# Patient Record
Sex: FEMALE | Race: WHITE
[De-identification: names, ages, dates, MRNs, and addresses within clinical notes are randomized per-mention and may not be internally consistent; named-entity substitution may affect disease eponyms.]

---

## 2018-07-15 NOTE — ER DOCUMENT REPORT
ED General





- General


Chief Complaint: Vag Bleeding, +preg <12wks


Stated Complaint: VAGINAL BLEEDING


Time Seen by Provider: 07/15/18 07:07


TRAVEL OUTSIDE OF THE [place] IN LAST 30 DAYS: No





- HPI


Patient complains to provider of: Vaginal bleeding positive pregnancy


Notes: 





Patient coming in  her vaginal bleeding spotting.  Patient states started 

earlier this morning.  Patient denies any abdominal pain, cramping.  Patient 

denies any use of fertility medications.  Patient is unaware of her artery 

status that she has been taken prenatal care and has already followed up with 

her the OB/GYN town with his health care clinic.  States no comp occasions 

during previous pregnancies.  Denies any fevers chills nausea vomiting diarrhea





- Related Data


Allergies/Adverse Reactions: 


 





aspirin [Aspirin] Allergy (Verified 16 12:03)


 











Past Medical History





- General


Last Menstrual Period: 





- Social History


Smoking Status: Current Some Day Smoker


Chew tobacco use (# tins/day): No


Frequency of alcohol use: None


Drug Abuse: None


Family History: Reviewed & Not Pertinent


Patient has suicidal ideation: No


Patient has homicidal ideation: No





- Past Medical History


Cardiac Medical History: Reports: Hx Hypercholesterolemia, Hx Hypertension


Renal/ Medical History: Denies: Hx Peritoneal Dialysis


Psychiatric Medical History: Reports: Hx Anxiety


Past Surgical History: Reports: Hx Abdominal Surgery - bariatric, Hx 

Appendectomy, Hx Gastric Bypass Surgery, Hx Nose Surgery - rhinoplasty





- Immunizations


Hx Diphtheria, Pertussis, Tetanus Vaccination: No





Review of Systems





- Review of Systems


Constitutional: No symptoms reported


EENT: No symptoms reported


Cardiovascular: No symptoms reported


Respiratory: No symptoms reported


Gastrointestinal: No symptoms reported


Genitourinary: No symptoms reported


Female Genitourinary: Vaginal bleeding


Musculoskeletal: No symptoms reported


Skin: No symptoms reported


Hematologic/Lymphatic: No symptoms reported


Neurological/Psychological: No symptoms reported


-: Yes All other systems reviewed and negative





Physical Exam





- Vital signs


Vitals: 


 











Pulse Resp BP Pulse Ox


 


 106 H  17   150/91 H  99 


 


 07/15/18 06:09  07/15/18 06:09  07/15/18 06:09  07/15/18 06:09











Interpretation: Normal





- General


General appearance: Appears well, Alert





- HEENT


Head: Normocephalic, Atraumatic


Eyes: Normal


Pupils: PERRL





- Respiratory


Respiratory status: No respiratory distress


Chest status: Nontender


Breath sounds: Normal


Chest palpation: Normal





- Cardiovascular


Rhythm: Regular


Heart sounds: Normal auscultation


Murmur: No





- Abdominal


Inspection: Normal


Distension: No distension


Bowel sounds: Normal


Tenderness: Nontender


Organomegaly: No organomegaly





- Back


Back: Normal, Nontender





- Extremities


General upper extremity: Normal inspection, Nontender, Normal color, Normal ROM

, Normal temperature


General lower extremity: Normal inspection, Nontender, Normal color, Normal ROM

, Normal temperature, Normal weight bearing.  No: Phil's sign





- Neurological


Neuro grossly intact: Yes


Cognition: Normal


Orientation: AAOx4


Farrah Coma Scale Eye Opening: Spontaneous


Farrah Coma Scale Verbal: Oriented


Valdez Coma Scale Motor: Obeys Commands


Valdez Coma Scale Total: 15


Speech: Normal


Motor strength normal: LUE, RUE, LLE, RLE


Sensory: Normal





- Psychological


Associated symptoms: Normal affect, Normal mood





- Skin


Skin Temperature: Warm


Skin Moisture: Dry


Skin Color: Normal





Course





- Re-evaluation


Re-evalutation: 





07/15/18 13:33


Ultrasound shows IUP patient is a positive otherwise no signs of significant 

anemia requiring transfusion at this time.  Patient was encouraged to continue 

take her prenatal vitamins [facility] repeat beta-

hCG testing.





- Vital Signs


Vital signs: 


 











Temp Pulse Resp BP Pulse Ox


 


 98.7 F   93   16   135/87 H  97 


 


 07/15/18 08:55  07/15/18 08:55  07/15/18 08:55  07/15/18 08:55  07/15/18 08:55














- Laboratory


Result Diagrams: 


 07/15/18 07:35





 07/15/18 07:35


Laboratory results interpreted by me: 


 











  07/15/18 07/15/18





  07:35 07:35


 


Hgb  9.0 L 


 


Hct  27.4 L 


 


MCV  74 L 


 


MCH  24.3 L 


 


RDW  16.8 H 


 


Beta HCG, Quant   92777.00 H














Discharge





- Discharge


Clinical Impression: 


 Bleeding in early pregnancy





Condition: Good


Disposition: HOME, SELF-CARE


Instructions:  Bleeding During Early Pregnancy ([facility])


Additional Instructions: 


At this time your ultrasound shows a developing fetus within the uterus your 

blood level is around 14,500.  Would highly recommend observing pelvic rest 

nothing inside the vagina no sex no toys no tampons.  Would also recommend no 

strenuous activity.  Please continue your prenatal vitamins.  I would recommend 

following up with your OB/GYN on Monday he can also return to the ER and 72 

hours for repeat beta-hCG testing.  Please go to the  asked for 

outpatient laboratory have your blood drawn wait 2-3 hours  call the main ER 

number go through to prompts to get her results.  I would recommend doing this 

between business hours 8-5p.





There is a chance that she may continue on to have a miscarriage.  Bleeding 

early in pregnancy does increase chances of having a miscarriage by 

approximately 10%.  Return to ER immediately if she developed lightheadedness 

abdominal pain or fever





For nausea and vomiting during pregnancy I recomment:


Start with 10-12.5 mg of pyridoxine (vitamin B6) three times a day for 2 days. 

If not fully effective,


Increase to 12.5 mg of pyridoxine four times a day for 2 days. If not fully 

effective,


Increase to 25 mg of pyridoxine three times a day for 2 days. If not fully 

effective,


Continue 25 mg pyridoxine 3 times a day, and add 12.5 mg of doxylamine before 

bedtime each day for 2 days. If not fully effective,


Continue 25 mg pyridoxine 3 times a day, and take 12.5 mg of doxylamine twice a 

day. If not fully effective,


Continue 25 mg pyridoxine 3 times a day, and take 12.5 mg of doxylamine three 

times a day. If not fully effective,


Continue 25 mg pyridoxine 3 times a day, and 12.5 mg of doxylamine 3 times a day

, while adding Emetrol, one to two tablespoons (15-30 cc) taken once or twice a 

day as needed. (Emetrol is an over-the-counter mixture of sugar syrups and 

phosphoric acid [phosphorylated carbohydrate solution]) that acts by soothing 

the actual wall of the gastrointestinal tract). If not fully effective,


Consult with your doctor.


Forms:  Follow-Up Laboratory Testing

## 2018-07-15 NOTE — RADIOLOGY REPORT (SQ)
EXAM DESCRIPTION:  U/S OB TRANSVAG W/DOPPLER



COMPLETED DATE/TIME:  7/15/2018 8:08 am



REASON FOR STUDY:  _+preg vag bleeding



COMPARISON:  None.



TECHNIQUE:  Transvaginal static and realtime grayscale images acquired of the pelvis. Additional dashawn
cted spectral and color Doppler images recorded. All images stored on PACs.

bHCG: Pending.



LIMITATIONS:  None.



FINDINGS:  UTERUS: No masses.  No anomalies.

GESTATIONAL SAC: Yes.  6 weeks 1 day.

YOLK SAC: None

FETAL POLE: No 0

RIGHT ADNEXA: Ovary not identified.

No adnexal free fluid.

No adnexal masses.

LEFT ADNEXA: Normal ovary with normal vascular flow.

No adnexal free fluid.

2.2 cm cyst.

FREE FLUID: None.

OTHER: Cervical length 2.9 cm.



IMPRESSION:  POSSIBLE EARLY INTRAUTERINE PREGNANCY.

BHCG LEVEL NOT AVAILABLE FOR CORRELATION WITH US FINDINGS.

CONSIDER F/U BHCG AND/OR ULTRASOUND FOR VERIFICATION AND TO EXCLUDE ECTOPIC PREGNANCY.

Trimester of pregnancy:  First - 0 to 13 weeks.



TECHNICAL DOCUMENTATION:  JOB ID:  [id number]

 2011 [employer]- All Rights Reserved



Reading location - IP/workstation name: [email or web address]

## 2018-11-21 NOTE — XCELERA REPORT
[address] [address]

                               Tel: [phone]

                               Fax: [phone]



                      Transthoracic Echocardiogram Report

_______________________________________________________________________________



Name: KALYAN AVILA

MRN: [id number]                                Age: 42 yrs

Gender: Female                                 : 1975

Patient Status: Preadmit                       Patient Location: 

Account #: [id number]

Study Date: 2018 09:49 AM

Accession #: [id number]

_______________________________________________________________________________





_______________________________________________________________________________

Procedure: A two-dimensional transthoracic echocardiogram with color flow and

Doppler was performed. The study was technically difficult with many images

being suboptimal in quality.

Reason For Study: PERIPHERAL EDEMA



History: PERIPHERAL EDEMA.

Ordering Physician: RAJINDER HUMPHREY PA-C

Performed By: Radha Montilla

_______________________________________________________________________________





Interpretation Summary

The left ventricle is normal in size.

There is normal left ventricular wall thickness.

LV EF is > than 60%

Left ventricular systolic function is normal.

Doppler measurements suggest normal left ventricular diastolic function

The left ventricular wall motion is normal.

There is no thrombus.

The right atrium is normal.

The left atrial size is normal.

There is no evidence of mitral valve prolapse.

There is no vegetation seen on the mitral valve.

There is no mitral valve stenosis.

There is no mitral regurgitation noted.

There is no aortic valve stenosis

There is no LVOT obstruction.

No aortic regurgitation is present.

There is no tricuspid stenosis.

There is a trace amount of tricuspid regurgitation

Unable to calculate RVSP due lack of TR jet.

There is no pulmonic valvular stenosis.

There is a trace amount of pulmonic regurgitation

The aortic root is normal size.

There is no pericardial effusion.



MMode/2D Measurements & Calculations

RVDd: 3.1 cm   LVIDd: 5.2 cm  FS: 34.9 %              Ao root diam: 2.6 cm

IVSd: 0.78 cm  LVIDs: 3.4 cm  EDV(Teich): 128.0 ml    Ao root area: 5.3 cm2

               LVPWd: 1.0 cm  ESV(Teich): 46.4 ml

                              EF(Teich): 63.7 %



Doppler Measurements & Calculations

MV E max peter:       MV dec slope:         Ao V2 max:        LV V1 max P.0 cm/sec                               106.7 cm/sec      3.5 mmHg

MV A max peter:       545.1 cm/sec2         Ao max PG:        LV V1 max:

59.8 cm/sec         MV dec time: 0.13 sec 4.6 mmHg          93.5 cm/sec

MV E/A: 1.2



        _______________________________________________________________

PA V2 max:          PI max peter:

120.8 cm/sec        151.7 cm/sec

PA max P.8 mmHg PI max P.2 mmHg

                    PI dec slope:

                    133.5 cm/sec2





Left Ventricle

The left ventricle is normal in size. There is normal left ventricular wall

thickness. LV EF is > than 60%. Left ventricular systolic function is normal.

Doppler measurements suggest normal left ventricular diastolic function. The

left ventricular wall motion is normal. There is no thrombus.



Right Ventricle

The right ventricle is not well visualized secondary to technical limitations.



Atria

The right atrium is normal. The left atrial size is normal.



Mitral Valve

There is no evidence of mitral valve prolapse. There is no vegetation seen on

the mitral valve. There is no mitral valve stenosis. There is no mitral

regurgitation noted.





Aortic Valve

There is no aortic valvular vegetation. There is no aortic valve stenosis.

There is no LVOT obstruction. No aortic regurgitation is present.



Tricuspid Valve

There is no tricuspid stenosis. There is a trace amount of tricuspid

regurgitation. Unable to calculate RVSP due lack of TR jet.



Pulmonic Valve

There is no pulmonic valvular stenosis. There is a trace amount of pulmonic

regurgitation.



Great Vessels

The aortic root is normal size.





Effusions

There is no pericardial effusion.



_______________________________________________________________________________

_______________________________________________________________________________



Electronically signed by:      Lori Maier      on 2018 02:09 PM



CC: RAJINDER HUMPHREY PA-C

>

Lori Maier

## 2019-06-06 ENCOUNTER — HOSPITAL ENCOUNTER (EMERGENCY)
Dept: HOSPITAL 62 - ER | Age: 44
Discharge: HOME | End: 2019-06-06
Payer: MEDICAID

## 2019-06-06 VITALS — DIASTOLIC BLOOD PRESSURE: 62 MMHG | SYSTOLIC BLOOD PRESSURE: 120 MMHG

## 2019-06-06 DIAGNOSIS — Z90.49: ICD-10-CM

## 2019-06-06 DIAGNOSIS — Z98.84: ICD-10-CM

## 2019-06-06 DIAGNOSIS — R14.0: Primary | ICD-10-CM

## 2019-06-06 DIAGNOSIS — K59.00: ICD-10-CM

## 2019-06-06 DIAGNOSIS — F17.200: ICD-10-CM

## 2019-06-06 DIAGNOSIS — Z82.49: ICD-10-CM

## 2019-06-06 DIAGNOSIS — G89.29: ICD-10-CM

## 2019-06-06 LAB
ADD MANUAL DIFF: NO
ALBUMIN SERPL-MCNC: 4.3 G/DL (ref 3.5–5)
ALP SERPL-CCNC: 103 U/L (ref 38–126)
ALT SERPL-CCNC: 22 U/L (ref 9–52)
ANION GAP SERPL CALC-SCNC: 9 MMOL/L (ref 5–19)
APPEARANCE UR: (no result)
APTT PPP: (no result) S
AST SERPL-CCNC: 25 U/L (ref 14–36)
BASOPHILS # BLD AUTO: 0.1 10^3/UL (ref 0–0.2)
BASOPHILS NFR BLD AUTO: 1.3 % (ref 0–2)
BILIRUB DIRECT SERPL-MCNC: 0.3 MG/DL (ref 0–0.4)
BILIRUB SERPL-MCNC: 0.4 MG/DL (ref 0.2–1.3)
BILIRUB UR QL STRIP: NEGATIVE
BUN SERPL-MCNC: 9 MG/DL (ref 7–20)
CALCIUM: 9.6 MG/DL (ref 8.4–10.2)
CHLORIDE SERPL-SCNC: 105 MMOL/L (ref 98–107)
CHOLEST SERPL-MCNC: 190.06 MG/DL (ref 0–200)
CK MB SERPL-MCNC: < 0.22 NG/ML (ref ?–4.55)
CK SERPL-CCNC: 67 U/L (ref 30–135)
CO2 SERPL-SCNC: 24 MMOL/L (ref 22–30)
EOSINOPHIL # BLD AUTO: 0.2 10^3/UL (ref 0–0.6)
EOSINOPHIL NFR BLD AUTO: 2.6 % (ref 0–6)
ERYTHROCYTE [DISTWIDTH] IN BLOOD BY AUTOMATED COUNT: 19.3 % (ref 11.5–14)
GLUCOSE SERPL-MCNC: 111 MG/DL (ref 75–110)
GLUCOSE UR STRIP-MCNC: NEGATIVE MG/DL
HCT VFR BLD CALC: 32.9 % (ref 36–47)
HGB BLD-MCNC: 10.4 G/DL (ref 12–15.5)
KETONES UR STRIP-MCNC: NEGATIVE MG/DL
LDLC SERPL DIRECT ASSAY-MCNC: 125 MG/DL (ref ?–100)
LIPASE SERPL-CCNC: 219.5 U/L (ref 23–300)
LYMPHOCYTES # BLD AUTO: 1.6 10^3/UL (ref 0.5–4.7)
LYMPHOCYTES NFR BLD AUTO: 22.1 % (ref 13–45)
MCH RBC QN AUTO: 21.6 PG (ref 27–33.4)
MCHC RBC AUTO-ENTMCNC: 31.5 G/DL (ref 32–36)
MCV RBC AUTO: 69 FL (ref 80–97)
MONOCYTES # BLD AUTO: 0.6 10^3/UL (ref 0.1–1.4)
MONOCYTES NFR BLD AUTO: 8.4 % (ref 3–13)
NEUTROPHILS # BLD AUTO: 4.7 10^3/UL (ref 1.7–8.2)
NEUTS SEG NFR BLD AUTO: 65.6 % (ref 42–78)
NITRITE UR QL STRIP: NEGATIVE
PH UR STRIP: 6 [PH] (ref 5–9)
PLATELET # BLD: 230 10^3/UL (ref 150–450)
POTASSIUM SERPL-SCNC: 3.9 MMOL/L (ref 3.6–5)
PROT SERPL-MCNC: 7.9 G/DL (ref 6.3–8.2)
PROT UR STRIP-MCNC: NEGATIVE MG/DL
RBC # BLD AUTO: 4.78 10^6/UL (ref 3.72–5.28)
SODIUM SERPL-SCNC: 138.4 MMOL/L (ref 137–145)
SP GR UR STRIP: 1
TOTAL CELLS COUNTED % (AUTO): 100 %
TRIGL SERPL-MCNC: 128 MG/DL (ref ?–150)
TROPONIN I SERPL-MCNC: < 0.012 NG/ML
UROBILINOGEN UR-MCNC: NEGATIVE MG/DL (ref ?–2)
VLDLC SERPL CALC-MCNC: 26 MG/DL (ref 10–31)
WBC # BLD AUTO: 7.1 10^3/UL (ref 4–10.5)

## 2019-06-06 PROCEDURE — 83036 HEMOGLOBIN GLYCOSYLATED A1C: CPT

## 2019-06-06 PROCEDURE — 80053 COMPREHEN METABOLIC PANEL: CPT

## 2019-06-06 PROCEDURE — 85025 COMPLETE CBC W/AUTO DIFF WBC: CPT

## 2019-06-06 PROCEDURE — 81001 URINALYSIS AUTO W/SCOPE: CPT

## 2019-06-06 PROCEDURE — 93010 ELECTROCARDIOGRAM REPORT: CPT

## 2019-06-06 PROCEDURE — 81025 URINE PREGNANCY TEST: CPT

## 2019-06-06 PROCEDURE — 82550 ASSAY OF CK (CPK): CPT

## 2019-06-06 PROCEDURE — 93005 ELECTROCARDIOGRAM TRACING: CPT

## 2019-06-06 PROCEDURE — 36415 COLL VENOUS BLD VENIPUNCTURE: CPT

## 2019-06-06 PROCEDURE — 82553 CREATINE MB FRACTION: CPT

## 2019-06-06 PROCEDURE — 96360 HYDRATION IV INFUSION INIT: CPT

## 2019-06-06 PROCEDURE — 84484 ASSAY OF TROPONIN QUANT: CPT

## 2019-06-06 PROCEDURE — 83690 ASSAY OF LIPASE: CPT

## 2019-06-06 PROCEDURE — 80061 LIPID PANEL: CPT

## 2019-06-06 PROCEDURE — 99285 EMERGENCY DEPT VISIT HI MDM: CPT

## 2019-06-06 PROCEDURE — 71045 X-RAY EXAM CHEST 1 VIEW: CPT

## 2019-06-06 PROCEDURE — 74019 RADEX ABDOMEN 2 VIEWS: CPT

## 2019-06-06 PROCEDURE — 96361 HYDRATE IV INFUSION ADD-ON: CPT

## 2019-06-06 NOTE — RADIOLOGY REPORT (SQ)
EXAM DESCRIPTION:  ABDOMEN 2 VIEWS



COMPLETED DATE/TIME:  6/6/2019 10:37 am



REASON FOR STUDY:  abd pain



COMPARISON:  None.



NUMBER OF VIEWS:  Two views.



TECHNIQUE:  Supine and erect/decubitus radiographic images of the abdomen acquired.



LIMITATIONS:  None.



FINDINGS:  FREE AIR: None. No abnormal gas collections.

LUNG BASES: Clear.

BOWEL GAS PATTERN: Nonobstructive pattern. No dilated loops or air fluid levels.

CALCIFICATIONS: No suspicious calcifications.

SOFT TISSUES: No gross mass or suggestion of organomegaly.

HARDWARE: Surgical clips in the left upper quadrant.

BONES: No acute fracture. No worrisome bone lesions.

OTHER: No other significant finding.



IMPRESSION:  NO RADIOGRAPHIC EVIDENCE FOR ACUTE ABDOMINAL DISEASE.



TECHNICAL DOCUMENTATION:  JOB ID:  [id number]

 2011 [employer]- All Rights Reserved



Reading location - IP/workstation name: [email or web address]

## 2019-06-06 NOTE — RADIOLOGY REPORT (SQ)
EXAM DESCRIPTION:  CHEST SINGLE VIEW



COMPLETED DATE/TIME:  6/6/2019 10:40 am



REASON FOR STUDY:  CP



COMPARISON:  11/13/2016.



EXAM PARAMETERS:  NUMBER OF VIEWS: One view.

TECHNIQUE: Single frontal radiographic view of the chest acquired.

RADIATION DOSE: NA

LIMITATIONS: None.



FINDINGS:  LUNGS AND PLEURA: No opacities, masses or pneumothorax. No pleural effusion.

MEDIASTINUM AND HILAR STRUCTURES: No masses.  Contour normal.

HEART AND VASCULAR STRUCTURES: Heart normal in size.  Normal vasculature.

BONES: No acute findings.

HARDWARE: None in the chest.

OTHER: No other significant finding.



IMPRESSION:  NO ACUTE RADIOGRAPHIC FINDING IN THE CHEST.



TECHNICAL DOCUMENTATION:  JOB ID:  [id number]

 2011 [employer]- All Rights Reserved



Reading location - IP/workstation name: [email or web address]

## 2019-06-06 NOTE — ER DOCUMENT REPORT
ED General





- General


Stated Complaint: CHEST PRESSURE


Time Seen by Provider: 06/06/19 09:55


Primary Care Provider: 


RAJINDER HUMPHREY PA-C [Primary Care Provider] - Follow up as needed


Notes: 





43-year-old female patient emergency department chief complaint of constipation.

 Patient states that she has discomfort in her abdomen and radiates up into her 

chest.  Denies any specific chest pain.  States that she has been taking MiraLAX

and has not been helping.  Denies any other major symptoms.  No real abdominal 

pain but she does feel some pressure in the lower abdomen.  Patient states that 

she recently started taking MiraLAX twice a day.  Had a significant head injury 

a little over a year ago.  Has had neck surgery as well.  On chronic pain m

anagement.  Not taking narcotics however.


TRAVEL OUTSIDE OF THE [place] IN LAST 30 DAYS: No





- HPI


Onset: This morning


Onset/Duration: Gradual


Severity: Mild


Pain Level: 1


Associated symptoms: None





- Related Data


Allergies/Adverse Reactions: 


                                        





aspirin [Aspirin] Allergy (Verified 11/13/16 12:03)


   











Past Medical History





- General


Information source: Patient





- Social History


Smoking Status: Current Some Day Smoker


Frequency of alcohol use: None


Drug Abuse: None


Lives with: Family


Family History: CAD, Hypertension





- Past Medical History


Cardiac Medical History: Reports: Hx Hypercholesterolemia, Hx Hypertension


Renal/ Medical History: Denies: Hx Peritoneal Dialysis


Psychiatric Medical History: Reports: Hx Anxiety


Past Surgical History: Reports: Hx Abdominal Surgery - bariatric, Hx 

Appendectomy, Hx Gastric Bypass Surgery, Hx Nose Surgery - rhinoplasty, Other - 

Craniotomy





- Immunizations


Hx Diphtheria, Pertussis, Tetanus Vaccination: No





Review of Systems





- Review of Systems


Notes: 





Constitutional: denies: Chills, Diaphoresis, Fever, Malaise, Weakness





EENT: denies: Eye discharge, Blurred vision, Tearing, Double vision, Nose 

congestion, Nose discharge, Throat swelling, Mouth pain





Cardiovascular: denies: Palpitations, Heart racing, Orthopnea, Dyspnea, Chest p

ain





Respiratory: denies: Cough, Hurts to breathe, Wheezing, Shortness of breath





Gastrointestinal: denies: Abdominal pain, Diarrhea, Nausea, Vomiting, Black 

stools, bright red blood in stool.  Does complain of constipation and some 

abdominal pressure but no abdominal pain.





Genitourinary: denies: Burning, Dysuria, Discharge, Frequency, Flank pain, 

Hematuria





Musculoskeletal:  denies: Joint pain, Joint swelling, Muscle pain, Muscle 

stiffness, back pain





Hematologic/Lymphatic:  denies: Anemia, Easy bleeding, Easy bruising, Blood 

clots





Neurological/Psychological: denies: Confusion, Dementia, Depression, Loss of 

consciousness





Skin: No lesions, no masses, no skin breakdown, no abscesses





Physical Exam





- Vital signs


Vitals: 


                                        











Temp


 


 99.1 F 


 


 06/06/19 09:47











Interpretation: Normal





- General


General appearance: Appears well, Alert





- HEENT


Head: Normocephalic, Atraumatic


Eyes: Normal


Pupils: PERRL





- Respiratory


Respiratory status: No respiratory distress


Chest status: Nontender


Breath sounds: Normal


Chest palpation: Normal





- Cardiovascular


Rhythm: Regular


Heart sounds: Normal auscultation


Murmur: No





- Abdominal


Inspection: Normal


Distension: No distension


Bowel sounds: Normal


Tenderness: Nontender


Organomegaly: No organomegaly





- Back


Back: Normal, Nontender





- Extremities


General upper extremity: Normal inspection, Nontender, Normal color, Normal ROM,

 Normal temperature


General lower extremity: Normal inspection, Nontender, Normal color, Normal ROM,

 Normal temperature, Normal weight bearing.  No: Phil's sign





- Neurological


Neuro grossly intact: Yes


Cognition: Normal


Orientation: AAOx4


Farrah Coma Scale Eye Opening: Spontaneous


Kelly Coma Scale Verbal: Oriented


Kelly Coma Scale Motor: Obeys Commands


Farrah Coma Scale Total: 15


Speech: Normal


Motor strength normal: LUE, RUE, LLE, RLE


Sensory: Normal





- Psychological


Associated symptoms: Normal affect, Normal mood





- Skin


Skin Temperature: Warm


Skin Moisture: Dry


Skin Color: Normal





Course





- Re-evaluation


Re-evalutation: 





06/06/19 12:28





Laboratory











  06/06/19 06/06/19 06/06/19





  09:50 09:50 09:50


 


WBC  7.1  


 


RBC  4.78  


 


Hgb  10.4 L  


 


Hct  32.9 L  


 


MCV  69 L  


 


MCH  21.6 L  


 


MCHC  31.5 L  


 


RDW  19.3 H  


 


Plt Count  230  


 


Seg Neutrophils %  65.6  


 


Lymphocytes %  22.1  


 


Monocytes %  8.4  


 


Eosinophils %  2.6  


 


Basophils %  1.3  


 


Absolute Neutrophils  4.7  


 


Absolute Lymphocytes  1.6  


 


Absolute Monocytes  0.6  


 


Absolute Eosinophils  0.2  


 


Absolute Basophils  0.1  


 


Sodium   138.4 


 


Potassium   3.9 


 


Chloride   105 


 


Carbon Dioxide   24 


 


Anion Gap   9 


 


BUN   9 


 


Creatinine   0.73 


 


Est GFR ( Amer)   > 60 


 


Est GFR (Non-Af Amer)   > 60 


 


Glucose   111 H 


 


Hemoglobin A1c %   


 


Calcium   9.6 


 


Total Bilirubin   0.4 


 


Direct Bilirubin   0.3 


 


Neonat Total Bilirubin   Not Reportable 


 


Neonat Direct Bilirubin   Not Reportable 


 


Neonat Indirect Bili   Not Reportable 


 


AST   25 


 


ALT   22 


 


Alkaline Phosphatase   103 


 


Creatine Kinase   67 


 


CK-MB (CK-2)    < 0.22


 


Troponin I    < 0.012


 


Total Protein   7.9 


 


Albumin   4.3 


 


Triglycerides   


 


Cholesterol   


 


LDL Cholesterol Direct   


 


VLDL Cholesterol   


 


HDL Cholesterol   


 


Lipase   


 


Urine Color   


 


Urine Appearance   


 


Urine pH   


 


Ur Specific Gravity   


 


Urine Protein   


 


Urine Glucose (UA)   


 


Urine Ketones   


 


Urine Blood   


 


Urine Nitrite   


 


Urine Bilirubin   


 


Urine Urobilinogen   


 


Ur Leukocyte Esterase   


 


Urine WBC (Auto)   


 


Urine RBC (Auto)   


 


Squamous Epi Cells Auto   


 


Urine Ascorbic Acid   














  06/06/19 06/06/19 06/06/19





  09:50 09:50 10:40


 


WBC   


 


RBC   


 


Hgb   


 


Hct   


 


MCV   


 


MCH   


 


MCHC   


 


RDW   


 


Plt Count   


 


Seg Neutrophils %   


 


Lymphocytes %   


 


Monocytes %   


 


Eosinophils %   


 


Basophils %   


 


Absolute Neutrophils   


 


Absolute Lymphocytes   


 


Absolute Monocytes   


 


Absolute Eosinophils   


 


Absolute Basophils   


 


Sodium   


 


Potassium   


 


Chloride   


 


Carbon Dioxide   


 


Anion Gap   


 


BUN   


 


Creatinine   


 


Est GFR ( Amer)   


 


Est GFR (Non-Af Amer)   


 


Glucose   


 


Hemoglobin A1c %   5.7 


 


Calcium   


 


Total Bilirubin   


 


Direct Bilirubin   


 


Neonat Total Bilirubin   


 


Neonat Direct Bilirubin   


 


Neonat Indirect Bili   


 


AST   


 


ALT   


 


Alkaline Phosphatase   


 


Creatine Kinase   


 


CK-MB (CK-2)   


 


Troponin I   


 


Total Protein   


 


Albumin   


 


Triglycerides  128  


 


Cholesterol  190.06  


 


LDL Cholesterol Direct  125 H  


 


VLDL Cholesterol  26.0  


 


HDL Cholesterol  35 L  


 


Lipase  219.5  


 


Urine Color    STRAW


 


Urine Appearance    SLIGHTLY-CLOUDY


 


Urine pH    6.0


 


Ur Specific Gravity    1.003


 


Urine Protein    NEGATIVE


 


Urine Glucose (UA)    NEGATIVE


 


Urine Ketones    NEGATIVE


 


Urine Blood    SMALL H


 


Urine Nitrite    NEGATIVE


 


Urine Bilirubin    NEGATIVE


 


Urine Urobilinogen    NEGATIVE


 


Ur Leukocyte Esterase    TRACE H


 


Urine WBC (Auto)    2


 


Urine RBC (Auto)    0


 


Squamous Epi Cells Auto    5


 


Urine Ascorbic Acid    NEGATIVE











                                        





Chest X-Ray  06/06/19 00:00


IMPRESSION:  NO ACUTE RADIOGRAPHIC FINDING IN THE CHEST.


 








Abdomen X-Ray  06/06/19 10:13


IMPRESSION:  NO RADIOGRAPHIC EVIDENCE FOR ACUTE ABDOMINAL DISEASE.


 











06/06/19 12:29


This is a well-appearing female in no acute distress.  Initial EKG and cardiac 

labs unremarkable.  Slightly elevated lipid profile.


06/06/19 13:49


Patient has 2- troponins.  I do not believe this is a cardiac event.  I am 

giving her some lactulose to go home with.  I have advised if she is feeling 

uncomfortable continuing with the propylene glycol then she should stop it.  

Follow-up with your regular doctor.  Discharge at this time in stable condition.





- Vital Signs


Vital signs: 


                                        











Temp Pulse Resp BP Pulse Ox


 


 99.1 F      18   114/77   96 


 


 06/06/19 09:47     06/06/19 13:00  06/06/19 12:01  06/06/19 13:00














- Laboratory


Result Diagrams: 


                                 06/06/19 09:50





                                 06/06/19 09:50


Laboratory results interpreted by me: 


                                        











  06/06/19 06/06/19 06/06/19





  09:50 09:50 09:50


 


Hgb  10.4 L  


 


Hct  32.9 L  


 


MCV  69 L  


 


MCH  21.6 L  


 


MCHC  31.5 L  


 


RDW  19.3 H  


 


Glucose   111 H 


 


LDL Cholesterol Direct    125 H


 


HDL Cholesterol    35 L


 


Urine Blood   


 


Ur Leukocyte Esterase   














  06/06/19





  10:40


 


Hgb 


 


Hct 


 


MCV 


 


MCH 


 


MCHC 


 


RDW 


 


Glucose 


 


LDL Cholesterol Direct 


 


HDL Cholesterol 


 


Urine Blood  SMALL H


 


Ur Leukocyte Esterase  TRACE H














- EKG Interpretation by Me


EKG shows normal: Sinus rhythm, Axis, Intervals, QRS Complexes, ST-T Waves





Discharge





- Discharge


Clinical Impression: 


 Bloating





Constipation


Qualifiers:


 Constipation type: unspecified constipation type Qualified Code(s): K59.00 - 

Constipation, unspecified





Condition: Good


Disposition: HOME, SELF-CARE


Instructions:  Abdominal Pain ([facility]), Constipation ([facility])


Additional Instructions: 


The work-up today did not reveal anything abnormal.  Please keep a close eye on 

your symptoms in the event that things are getting worse she will need to be re-

seen.  Please follow-up with your regular doctor.  I am prescribing something 

different for your constipation.  This may help.


Prescriptions: 


Lactulose [Enulose] 10 gm PO BID PRN 10 Days #240 ml


 PRN Reason: 


Referrals: 


RAJINDER HUMPHREY PA-C [Primary Care Provider] - Follow up as needed

## 2020-03-05 ENCOUNTER — HOSPITAL ENCOUNTER (EMERGENCY)
Dept: HOSPITAL 62 - ER | Age: 45
Discharge: HOME | End: 2020-03-05
Payer: MEDICAID

## 2020-03-05 VITALS — SYSTOLIC BLOOD PRESSURE: 151 MMHG | DIASTOLIC BLOOD PRESSURE: 97 MMHG

## 2020-03-05 DIAGNOSIS — F12.10: ICD-10-CM

## 2020-03-05 DIAGNOSIS — R56.9: Primary | ICD-10-CM

## 2020-03-05 DIAGNOSIS — F17.210: ICD-10-CM

## 2020-03-05 DIAGNOSIS — S00.512A: ICD-10-CM

## 2020-03-05 DIAGNOSIS — R32: ICD-10-CM

## 2020-03-05 DIAGNOSIS — R51: ICD-10-CM

## 2020-03-05 DIAGNOSIS — Z88.8: ICD-10-CM

## 2020-03-05 DIAGNOSIS — I10: ICD-10-CM

## 2020-03-05 DIAGNOSIS — Z98.84: ICD-10-CM
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
## 2020-03-05 LAB
ADD MANUAL DIFF: NO
ALBUMIN SERPL-MCNC: 4.5 G/DL (ref 3.5–5)
ALP SERPL-CCNC: 112 U/L (ref 38–126)
ANION GAP SERPL CALC-SCNC: 10 MMOL/L (ref 5–19)
APPEARANCE UR: (no result)
APTT PPP: (no result) S
AST SERPL-CCNC: 23 U/L (ref 14–36)
BARBITURATES UR QL SCN: NEGATIVE
BASOPHILS # BLD AUTO: 0.1 10^3/UL (ref 0–0.2)
BASOPHILS NFR BLD AUTO: 0.8 % (ref 0–2)
BILIRUB DIRECT SERPL-MCNC: 0.1 MG/DL (ref 0–0.4)
BILIRUB SERPL-MCNC: 0.5 MG/DL (ref 0.2–1.3)
BILIRUB UR QL STRIP: NEGATIVE
BUN SERPL-MCNC: 4 MG/DL (ref 7–20)
CALCIUM: 9.3 MG/DL (ref 8.4–10.2)
CHLORIDE SERPL-SCNC: 104 MMOL/L (ref 98–107)
CO2 SERPL-SCNC: 23 MMOL/L (ref 22–30)
EOSINOPHIL # BLD AUTO: 0.1 10^3/UL (ref 0–0.6)
EOSINOPHIL NFR BLD AUTO: 0.8 % (ref 0–6)
ERYTHROCYTE [DISTWIDTH] IN BLOOD BY AUTOMATED COUNT: 18.2 % (ref 11.5–14)
ETHANOL SERPL-MCNC: < 10 MG/DL
GLUCOSE SERPL-MCNC: 125 MG/DL (ref 75–110)
GLUCOSE UR STRIP-MCNC: NEGATIVE MG/DL
HCT VFR BLD CALC: 29.8 % (ref 36–47)
HGB BLD-MCNC: 9.8 G/DL (ref 12–15.5)
KETONES UR STRIP-MCNC: NEGATIVE MG/DL
LYMPHOCYTES # BLD AUTO: 1.3 10^3/UL (ref 0.5–4.7)
LYMPHOCYTES NFR BLD AUTO: 18 % (ref 13–45)
MCH RBC QN AUTO: 23.7 PG (ref 27–33.4)
MCHC RBC AUTO-ENTMCNC: 32.8 G/DL (ref 32–36)
MCV RBC AUTO: 72 FL (ref 80–97)
METHADONE UR QL SCN: NEGATIVE
MONOCYTES # BLD AUTO: 0.6 10^3/UL (ref 0.1–1.4)
MONOCYTES NFR BLD AUTO: 7.4 % (ref 3–13)
NEUTROPHILS # BLD AUTO: 5.5 10^3/UL (ref 1.7–8.2)
NEUTS SEG NFR BLD AUTO: 73 % (ref 42–78)
NITRITE UR QL STRIP: NEGATIVE
PCP UR QL SCN: NEGATIVE
PH UR STRIP: 6 [PH] (ref 5–9)
PLATELET # BLD: 237 10^3/UL (ref 150–450)
POTASSIUM SERPL-SCNC: 3.9 MMOL/L (ref 3.6–5)
PROT SERPL-MCNC: 8.1 G/DL (ref 6.3–8.2)
PROT UR STRIP-MCNC: NEGATIVE MG/DL
RBC # BLD AUTO: 4.12 10^6/UL (ref 3.72–5.28)
SP GR UR STRIP: 1
TOTAL CELLS COUNTED % (AUTO): 100 %
URINE AMPHETAMINES SCREEN: NEGATIVE
URINE BENZODIAZEPINES SCREEN: NEGATIVE
URINE COCAINE SCREEN: NEGATIVE
URINE MARIJUANA (THC) SCREEN: NEGATIVE
UROBILINOGEN UR-MCNC: NEGATIVE MG/DL (ref ?–2)
WBC # BLD AUTO: 7.5 10^3/UL (ref 4–10.5)

## 2020-03-05 PROCEDURE — 81001 URINALYSIS AUTO W/SCOPE: CPT

## 2020-03-05 PROCEDURE — 93010 ELECTROCARDIOGRAM REPORT: CPT

## 2020-03-05 PROCEDURE — 36415 COLL VENOUS BLD VENIPUNCTURE: CPT

## 2020-03-05 PROCEDURE — 85025 COMPLETE CBC W/AUTO DIFF WBC: CPT

## 2020-03-05 PROCEDURE — 80307 DRUG TEST PRSMV CHEM ANLYZR: CPT

## 2020-03-05 PROCEDURE — 80053 COMPREHEN METABOLIC PANEL: CPT

## 2020-03-05 PROCEDURE — 71045 X-RAY EXAM CHEST 1 VIEW: CPT

## 2020-03-05 PROCEDURE — 93005 ELECTROCARDIOGRAM TRACING: CPT

## 2020-03-05 PROCEDURE — 99285 EMERGENCY DEPT VISIT HI MDM: CPT

## 2020-03-05 PROCEDURE — 70450 CT HEAD/BRAIN W/O DYE: CPT

## 2020-03-05 PROCEDURE — 83735 ASSAY OF MAGNESIUM: CPT

## 2020-03-05 NOTE — RADIOLOGY REPORT (SQ)
EXAM DESCRIPTION:  CT HEAD WITHOUT



COMPLETED DATE/TIME:  3/5/2020 5:23 pm



REASON FOR STUDY:  Seizure and fall with head injury



COMPARISON:  11/13/2016.



TECHNIQUE:  Axial images acquired through the brain without intravenous contrast.  Images reviewed wi
th bone, brain and subdural windows.  Additional sagittal and coronal reconstructions were generated.
 Images stored on PACS.

All CT scanners at this facility use dose modulation, iterative reconstruction, and/or weight based d
osing when appropriate to reduce radiation dose to as low as reasonably achievable (ALARA).

[facility]: Dose Right  [facility]: CareDose    [facility]: Dose Right    [facility]: Teradose 4D    [facility]: Smart Technologies



RADIATION DOSE:  CT Rad equipment meets quality standard of care and radiation dose reduction techniq
ues were employed. CTDIvol: 53.2 mGy. DLP: 964 mGy-cm. mGy.



LIMITATIONS:  None.



FINDINGS:  VENTRICLES: Normal size and contour.

CEREBRUM: No masses.  No hemorrhage.  No midline shift.  No evidence for acute infarction. Normal gra
y/white matter differentiation. No areas of low density in the white matter.

CEREBELLUM: No masses.  No hemorrhage.  No alteration of density.  No evidence for acute infarction.

EXTRAAXIAL SPACES: No fluid collections.  No masses.

ORBITS AND GLOBE: No intra- or extraconal masses.  Normal contour of globe without masses.

CALVARIUM: No fracture.

PARANASAL SINUSES: No fluid or mucosal thickening.

SOFT TISSUES: No mass or hematoma.

OTHER: No other significant finding.



IMPRESSION:  NORMAL BRAIN CT WITHOUT CONTRAST.

EVIDENCE OF ACUTE STROKE: NO.



COMMENT:  Quality ID # 436: Final reports with documentation of one or more dose reduction techniques
 (e.g., Automated exposure control, adjustment of the mA and/or kV according to patient size, use of 
iterative reconstruction technique)



TECHNICAL DOCUMENTATION:  JOB ID:  [id number]

 2011 [employer]- All Rights Reserved



Reading location - IP/workstation name: [email or web address]

## 2020-03-05 NOTE — RADIOLOGY REPORT (SQ)
EXAM DESCRIPTION:  CHEST SINGLE VIEW



COMPLETED DATE/TIME:  3/5/2020 5:08 pm



REASON FOR STUDY:  Seizure and fall with head injury



COMPARISON:  6/6/2019.



EXAM PARAMETERS:  NUMBER OF VIEWS: One view.

TECHNIQUE: Single frontal radiographic view of the chest acquired.

RADIATION DOSE: NA

LIMITATIONS: None.



FINDINGS:  LUNGS AND PLEURA: No opacities, masses or pneumothorax. No pleural effusion.

MEDIASTINUM AND HILAR STRUCTURES: No masses.  Contour normal.

HEART AND VASCULAR STRUCTURES: Heart normal in size.  Normal vasculature.

BONES: No acute findings.

HARDWARE: None in the chest.

OTHER: No other significant finding.



IMPRESSION:  NO ACUTE RADIOGRAPHIC FINDING IN THE CHEST.



TECHNICAL DOCUMENTATION:  JOB ID:  [id number]

 2011 [employer]- All Rights Reserved



Reading location - IP/workstation name: [email or web address]
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
## 2021-04-20 ENCOUNTER — HOSPITAL ENCOUNTER (OUTPATIENT)
Dept: HOSPITAL 95 - LAB SHORT | Age: 46
End: 2021-04-20
Attending: OBSTETRICS & GYNECOLOGY
Payer: COMMERCIAL

## 2021-04-20 DIAGNOSIS — N92.0: Primary | ICD-10-CM

## 2022-04-22 ENCOUNTER — HOSPITAL ENCOUNTER (OUTPATIENT)
Dept: HOSPITAL 95 - ORSCSDS | Age: 47
Discharge: HOME | End: 2022-04-22
Attending: PODIATRIST
Payer: COMMERCIAL

## 2022-04-22 VITALS — WEIGHT: 210.1 LBS | HEIGHT: 68 IN | BODY MASS INDEX: 31.84 KG/M2

## 2022-04-22 DIAGNOSIS — Z79.82: ICD-10-CM

## 2022-04-22 DIAGNOSIS — K21.9: ICD-10-CM

## 2022-04-22 DIAGNOSIS — M20.5X1: Primary | ICD-10-CM

## 2022-04-22 DIAGNOSIS — Z79.899: ICD-10-CM

## 2022-04-22 PROCEDURE — 0SGM04Z FUSION OF RIGHT METATARSAL-PHALANGEAL JOINT WITH INTERNAL FIXATION DEVICE, OPEN APPROACH: ICD-10-PCS | Performed by: PODIATRIST

## 2022-04-22 PROCEDURE — C1713 ANCHOR/SCREW BN/BN,TIS/BN: HCPCS

## 2022-04-22 NOTE — NUR
04/22/22 0950 Mckenzie Manrique
PT UPDATED REGARDING THE DELAY IN HER CASE. NO NEEDS AT THIS TIME.
CALL LIGHT IN REACH.

## 2022-08-19 ENCOUNTER — HOSPITAL ENCOUNTER (OUTPATIENT)
Dept: HOSPITAL 95 - ORSCSDS | Age: 47
Discharge: HOME | End: 2022-08-19
Attending: PODIATRIST
Payer: COMMERCIAL

## 2022-08-19 VITALS — BODY MASS INDEX: 33.11 KG/M2 | WEIGHT: 210.98 LBS | HEIGHT: 67 IN

## 2022-08-19 DIAGNOSIS — E66.9: ICD-10-CM

## 2022-08-19 DIAGNOSIS — M20.22: Primary | ICD-10-CM

## 2022-08-19 DIAGNOSIS — M79.672: ICD-10-CM

## 2022-08-19 PROCEDURE — A9270 NON-COVERED ITEM OR SERVICE: HCPCS

## 2022-08-19 PROCEDURE — C1713 ANCHOR/SCREW BN/BN,TIS/BN: HCPCS

## 2022-08-19 PROCEDURE — 0SGN04Z FUSION OF LEFT METATARSAL-PHALANGEAL JOINT WITH INTERNAL FIXATION DEVICE, OPEN APPROACH: ICD-10-PCS | Performed by: PODIATRIST

## 2022-08-19 NOTE — NUR
08/19/22 0748 Ken Kirk
ROPIVACAINE 0.2% 20 MLS MIXED W/ 0.10 MLS EPI PER ORDER TO MAKE
ROPIVACAINE 0.2% 1:200,000 FOR INJECTION AT [facility] BY DR OKEEFE.
20 MLS INJECTED BY DR OKEEFE AT [facility]

## 2023-06-05 ENCOUNTER — APPOINTMENT (RX ONLY)
Dept: URBAN - NONMETROPOLITAN AREA CLINIC 8 | Facility: CLINIC | Age: 48
Setting detail: DERMATOLOGY
End: 2023-06-05

## 2023-06-05 DIAGNOSIS — L72.0 EPIDERMAL CYST: ICD-10-CM

## 2023-06-05 DIAGNOSIS — L738 OTHER SPECIFIED DISEASES OF HAIR AND HAIR FOLLICLES: ICD-10-CM

## 2023-06-05 DIAGNOSIS — L73.9 FOLLICULAR DISORDER, UNSPECIFIED: ICD-10-CM

## 2023-06-05 DIAGNOSIS — L663 OTHER SPECIFIED DISEASES OF HAIR AND HAIR FOLLICLES: ICD-10-CM

## 2023-06-05 PROBLEM — L02.422 FURUNCLE OF LEFT AXILLA: Status: ACTIVE | Noted: 2023-06-05

## 2023-06-05 PROBLEM — L02.421 FURUNCLE OF RIGHT AXILLA: Status: ACTIVE | Noted: 2023-06-05

## 2023-06-05 PROBLEM — L02.02 FURUNCLE OF FACE: Status: ACTIVE | Noted: 2023-06-05

## 2023-06-05 PROCEDURE — ? COUNSELING

## 2023-06-05 PROCEDURE — ? ADDITIONAL NOTES

## 2023-06-05 PROCEDURE — 99203 OFFICE O/P NEW LOW 30 MIN: CPT

## 2023-06-05 PROCEDURE — ? ORDER TESTS

## 2023-06-05 PROCEDURE — ? PRESCRIPTION

## 2023-06-05 RX ORDER — CLINDAMYCIN PHOSPHATE 10 MG/ML
LOTION TOPICAL BID
Qty: 60 | Refills: 3 | Status: ERX | COMMUNITY
Start: 2023-06-05

## 2023-06-05 RX ADMIN — CLINDAMYCIN PHOSPHATE: 10 LOTION TOPICAL at 00:00

## 2023-06-05 ASSESSMENT — LOCATION SIMPLE DESCRIPTION DERM
LOCATION SIMPLE: RIGHT AXILLARY VAULT
LOCATION SIMPLE: LEFT AXILLARY VAULT
LOCATION SIMPLE: LEFT NOSE

## 2023-06-05 ASSESSMENT — LOCATION DETAILED DESCRIPTION DERM
LOCATION DETAILED: LEFT NARIS
LOCATION DETAILED: LEFT AXILLARY VAULT
LOCATION DETAILED: RIGHT AXILLARY VAULT

## 2023-06-05 ASSESSMENT — LOCATION ZONE DERM
LOCATION ZONE: NOSE
LOCATION ZONE: AXILLAE

## 2023-06-05 NOTE — PROCEDURE: COUNSELING
Detail Level: Detailed
Patient Specific Counseling (Will Not Stick From Patient To Patient): If EIC returns , patient will consider exc apt.

## 2023-06-05 NOTE — PROCEDURE: ADDITIONAL NOTES
Detail Level: Simple
Render Risk Assessment In Note?: no
Additional Notes: Patient will continue to use BPO cleanser in the shower

## 2023-06-13 ENCOUNTER — RX ONLY (OUTPATIENT)
Age: 48
Setting detail: RX ONLY
End: 2023-06-13

## 2023-06-13 RX ORDER — MINOCYCLINE HYDROCHLORIDE 100 MG/1
TABLET ORAL
Qty: 20 | Refills: 0 | Status: ERX | COMMUNITY
Start: 2023-06-13

## 2024-11-25 ENCOUNTER — HOSPITAL ENCOUNTER (OUTPATIENT)
Dept: HOSPITAL 95 - LAB SHORT | Age: 49
Discharge: HOME | End: 2024-11-25
Attending: INTERNAL MEDICINE
Payer: COMMERCIAL

## 2024-11-25 DIAGNOSIS — L82.1: Primary | ICD-10-CM
